# Patient Record
Sex: FEMALE | Race: WHITE | NOT HISPANIC OR LATINO | Employment: OTHER | URBAN - METROPOLITAN AREA
[De-identification: names, ages, dates, MRNs, and addresses within clinical notes are randomized per-mention and may not be internally consistent; named-entity substitution may affect disease eponyms.]

---

## 2017-01-01 ENCOUNTER — HOSPITAL ENCOUNTER (INPATIENT)
Facility: HOSPITAL | Age: 72
LOS: 1 days | DRG: 357 | End: 2017-09-07
Attending: SURGERY | Admitting: SURGERY
Payer: MEDICARE

## 2017-01-01 ENCOUNTER — ANESTHESIA EVENT (OUTPATIENT)
Dept: SURGERY | Facility: HOSPITAL | Age: 72
DRG: 357 | End: 2017-01-01
Payer: MEDICARE

## 2017-01-01 ENCOUNTER — ANESTHESIA (OUTPATIENT)
Dept: SURGERY | Facility: HOSPITAL | Age: 72
DRG: 357 | End: 2017-01-01
Payer: MEDICARE

## 2017-01-01 VITALS
TEMPERATURE: 98 F | RESPIRATION RATE: 14 BRPM | OXYGEN SATURATION: 86 % | WEIGHT: 213.63 LBS | HEIGHT: 66 IN | SYSTOLIC BLOOD PRESSURE: 80 MMHG | DIASTOLIC BLOOD PRESSURE: 54 MMHG | HEART RATE: 82 BPM | BODY MASS INDEX: 34.33 KG/M2

## 2017-01-01 DIAGNOSIS — I82.90 THROMBOSIS: ICD-10-CM

## 2017-01-01 DIAGNOSIS — I42.9 CARDIOMYOPATHY: ICD-10-CM

## 2017-01-01 DIAGNOSIS — K55.9 MESENTERIC ISCHEMIA: ICD-10-CM

## 2017-01-01 DIAGNOSIS — I82.890 SMALL VESSEL THROMBOSIS OF SKIN: ICD-10-CM

## 2017-01-01 DIAGNOSIS — R00.0 TACHYCARDIA: ICD-10-CM

## 2017-01-01 LAB
ABO + RH BLD: NORMAL
ALBUMIN SERPL BCP-MCNC: 1.8 G/DL
ALLENS TEST: ABNORMAL
ALP SERPL-CCNC: 87 U/L
ALT SERPL W/O P-5'-P-CCNC: 16 U/L
ANION GAP SERPL CALC-SCNC: 12 MMOL/L
ANION GAP SERPL CALC-SCNC: 15 MMOL/L
ANISOCYTOSIS BLD QL SMEAR: SLIGHT
APTT BLDCRRT: 28 SEC
AST SERPL-CCNC: 13 U/L
BASOPHILS # BLD AUTO: 0.01 K/UL
BASOPHILS # BLD AUTO: 0.03 K/UL
BASOPHILS NFR BLD: 0.1 %
BASOPHILS NFR BLD: 0.2 %
BILIRUB SERPL-MCNC: 1.6 MG/DL
BLD GP AB SCN CELLS X3 SERPL QL: NORMAL
BNP SERPL-MCNC: 245 PG/ML
BUN SERPL-MCNC: 55 MG/DL
BUN SERPL-MCNC: 58 MG/DL
BURR CELLS BLD QL SMEAR: ABNORMAL
CALCIUM SERPL-MCNC: 10 MG/DL
CALCIUM SERPL-MCNC: 8.9 MG/DL
CHLORIDE SERPL-SCNC: 101 MMOL/L
CHLORIDE SERPL-SCNC: 102 MMOL/L
CO2 SERPL-SCNC: 33 MMOL/L
CO2 SERPL-SCNC: 34 MMOL/L
CREAT SERPL-MCNC: 1.7 MG/DL
CREAT SERPL-MCNC: 1.8 MG/DL
DELSYS: ABNORMAL
DIFFERENTIAL METHOD: ABNORMAL
DIFFERENTIAL METHOD: ABNORMAL
EOSINOPHIL # BLD AUTO: 0 K/UL
EOSINOPHIL # BLD AUTO: 0 K/UL
EOSINOPHIL NFR BLD: 0 %
EOSINOPHIL NFR BLD: 0 %
EP: 7
ERYTHROCYTE [DISTWIDTH] IN BLOOD BY AUTOMATED COUNT: 17.2 %
ERYTHROCYTE [DISTWIDTH] IN BLOOD BY AUTOMATED COUNT: 17.3 %
ERYTHROCYTE [SEDIMENTATION RATE] IN BLOOD BY WESTERGREN METHOD: 12 MM/H
EST. GFR  (AFRICAN AMERICAN): 32 ML/MIN/1.73 M^2
EST. GFR  (AFRICAN AMERICAN): 34.2 ML/MIN/1.73 M^2
EST. GFR  (NON AFRICAN AMERICAN): 27.7 ML/MIN/1.73 M^2
EST. GFR  (NON AFRICAN AMERICAN): 29.7 ML/MIN/1.73 M^2
FACT X PPP CHRO-ACNC: 0.12 IU/ML
FACT X PPP CHRO-ACNC: 0.16 IU/ML
FACT X PPP CHRO-ACNC: 1.31 IU/ML
FIO2: 60
GLUCOSE SERPL-MCNC: 144 MG/DL
GLUCOSE SERPL-MCNC: 236 MG/DL
HCO3 UR-SCNC: 39.2 MMOL/L (ref 24–28)
HCT VFR BLD AUTO: 41.4 %
HCT VFR BLD AUTO: 46.3 %
HGB BLD-MCNC: 13.1 G/DL
HGB BLD-MCNC: 14.9 G/DL
INR PPP: 1
IP: 14
LACTATE SERPL-SCNC: 1.1 MMOL/L
LACTATE SERPL-SCNC: 1.4 MMOL/L
LYMPHOCYTES # BLD AUTO: 0.8 K/UL
LYMPHOCYTES # BLD AUTO: 0.9 K/UL
LYMPHOCYTES NFR BLD: 5.6 %
LYMPHOCYTES NFR BLD: 6.4 %
MAGNESIUM SERPL-MCNC: 2.1 MG/DL
MAGNESIUM SERPL-MCNC: 2.3 MG/DL
MCH RBC QN AUTO: 27.2 PG
MCH RBC QN AUTO: 27.6 PG
MCHC RBC AUTO-ENTMCNC: 31.6 G/DL
MCHC RBC AUTO-ENTMCNC: 32.2 G/DL
MCV RBC AUTO: 86 FL
MCV RBC AUTO: 86 FL
MIN VOL: 8.2
MITRAL VALVE REGURGITATION: NORMAL
MODE: ABNORMAL
MONOCYTES # BLD AUTO: 1.3 K/UL
MONOCYTES # BLD AUTO: 1.4 K/UL
MONOCYTES NFR BLD: 10.6 %
MONOCYTES NFR BLD: 9.1 %
NEUTROPHILS # BLD AUTO: 10.1 K/UL
NEUTROPHILS # BLD AUTO: 13.4 K/UL
NEUTROPHILS NFR BLD: 82 %
NEUTROPHILS NFR BLD: 85.1 %
PCO2 BLDA: 47.8 MMHG (ref 35–45)
PH SMN: 7.52 [PH] (ref 7.35–7.45)
PHOSPHATE SERPL-MCNC: 4.5 MG/DL
PHOSPHATE SERPL-MCNC: 5 MG/DL
PLATELET # BLD AUTO: 223 K/UL
PLATELET # BLD AUTO: 241 K/UL
PLATELET BLD QL SMEAR: ABNORMAL
PMV BLD AUTO: 9.8 FL
PMV BLD AUTO: 9.9 FL
PO2 BLDA: 78 MMHG (ref 80–100)
POC BE: 16 MMOL/L
POC SATURATED O2: 96 % (ref 95–100)
POC TCO2: 41 MMOL/L (ref 23–27)
POCT GLUCOSE: 161 MG/DL (ref 70–110)
POCT GLUCOSE: 164 MG/DL (ref 70–110)
POCT GLUCOSE: 230 MG/DL (ref 70–110)
POIKILOCYTOSIS BLD QL SMEAR: SLIGHT
POTASSIUM SERPL-SCNC: 2.8 MMOL/L
POTASSIUM SERPL-SCNC: 3.1 MMOL/L
PROT SERPL-MCNC: 5.9 G/DL
PROTHROMBIN TIME: 11.1 SEC
RBC # BLD AUTO: 4.82 M/UL
RBC # BLD AUTO: 5.39 M/UL
RETIRED EF AND QEF - SEE NOTES: 45 (ref 55–65)
SAMPLE: ABNORMAL
SITE: ABNORMAL
SODIUM SERPL-SCNC: 146 MMOL/L
SODIUM SERPL-SCNC: 151 MMOL/L
SP02: 97
SPONT RATE: 5
WBC # BLD AUTO: 12.27 K/UL
WBC # BLD AUTO: 15.89 K/UL

## 2017-01-01 PROCEDURE — 25000003 PHARM REV CODE 250: Performed by: SURGERY

## 2017-01-01 PROCEDURE — 25000003 PHARM REV CODE 250: Performed by: STUDENT IN AN ORGANIZED HEALTH CARE EDUCATION/TRAINING PROGRAM

## 2017-01-01 PROCEDURE — 27100171 HC OXYGEN HIGH FLOW UP TO 24 HOURS

## 2017-01-01 PROCEDURE — 37000009 HC ANESTHESIA EA ADD 15 MINS: Performed by: SURGERY

## 2017-01-01 PROCEDURE — 37000008 HC ANESTHESIA 1ST 15 MINUTES: Performed by: SURGERY

## 2017-01-01 PROCEDURE — A4216 STERILE WATER/SALINE, 10 ML: HCPCS | Performed by: SURGERY

## 2017-01-01 PROCEDURE — 99223 1ST HOSP IP/OBS HIGH 75: CPT | Mod: ,,, | Performed by: PHYSICIAN ASSISTANT

## 2017-01-01 PROCEDURE — 0WJP0ZZ INSPECTION OF GASTROINTESTINAL TRACT, OPEN APPROACH: ICD-10-PCS | Performed by: SURGERY

## 2017-01-01 PROCEDURE — C9113 INJ PANTOPRAZOLE SODIUM, VIA: HCPCS | Performed by: STUDENT IN AN ORGANIZED HEALTH CARE EDUCATION/TRAINING PROGRAM

## 2017-01-01 PROCEDURE — 86901 BLOOD TYPING SEROLOGIC RH(D): CPT

## 2017-01-01 PROCEDURE — 25000003 PHARM REV CODE 250: Performed by: ANESTHESIOLOGY

## 2017-01-01 PROCEDURE — 84100 ASSAY OF PHOSPHORUS: CPT

## 2017-01-01 PROCEDURE — 27000190 HC CPAP FULL FACE MASK W/VALVE

## 2017-01-01 PROCEDURE — 27800505 HC CATH, RADIAL ARTERY KIT: Performed by: NURSE ANESTHETIST, CERTIFIED REGISTERED

## 2017-01-01 PROCEDURE — 93307 TTE W/O DOPPLER COMPLETE: CPT | Mod: 26,,, | Performed by: INTERNAL MEDICINE

## 2017-01-01 PROCEDURE — 93005 ELECTROCARDIOGRAM TRACING: CPT

## 2017-01-01 PROCEDURE — 63600175 PHARM REV CODE 636 W HCPCS: Performed by: ANESTHESIOLOGY

## 2017-01-01 PROCEDURE — 85025 COMPLETE CBC W/AUTO DIFF WBC: CPT

## 2017-01-01 PROCEDURE — 94761 N-INVAS EAR/PLS OXIMETRY MLT: CPT

## 2017-01-01 PROCEDURE — 27100025 HC TUBING, SET FLUID WARMER: Performed by: NURSE ANESTHETIST, CERTIFIED REGISTERED

## 2017-01-01 PROCEDURE — 82803 BLOOD GASES ANY COMBINATION: CPT

## 2017-01-01 PROCEDURE — 36415 COLL VENOUS BLD VENIPUNCTURE: CPT

## 2017-01-01 PROCEDURE — 63600175 PHARM REV CODE 636 W HCPCS: Performed by: NURSE ANESTHETIST, CERTIFIED REGISTERED

## 2017-01-01 PROCEDURE — 49000 EXPLORATION OF ABDOMEN: CPT | Mod: ,,, | Performed by: SURGERY

## 2017-01-01 PROCEDURE — 99223 1ST HOSP IP/OBS HIGH 75: CPT | Mod: 57,,, | Performed by: SURGERY

## 2017-01-01 PROCEDURE — 85520 HEPARIN ASSAY: CPT | Mod: 91

## 2017-01-01 PROCEDURE — 86900 BLOOD TYPING SEROLOGIC ABO: CPT | Mod: 91

## 2017-01-01 PROCEDURE — 93307 TTE W/O DOPPLER COMPLETE: CPT

## 2017-01-01 PROCEDURE — 36000706: Performed by: SURGERY

## 2017-01-01 PROCEDURE — 83605 ASSAY OF LACTIC ACID: CPT

## 2017-01-01 PROCEDURE — 94660 CPAP INITIATION&MGMT: CPT

## 2017-01-01 PROCEDURE — 85610 PROTHROMBIN TIME: CPT

## 2017-01-01 PROCEDURE — D9220A PRA ANESTHESIA: Mod: CRNA,,, | Performed by: NURSE ANESTHETIST, CERTIFIED REGISTERED

## 2017-01-01 PROCEDURE — 84100 ASSAY OF PHOSPHORUS: CPT | Mod: 91

## 2017-01-01 PROCEDURE — S0030 INJECTION, METRONIDAZOLE: HCPCS | Performed by: STUDENT IN AN ORGANIZED HEALTH CARE EDUCATION/TRAINING PROGRAM

## 2017-01-01 PROCEDURE — 99900035 HC TECH TIME PER 15 MIN (STAT)

## 2017-01-01 PROCEDURE — 63600175 PHARM REV CODE 636 W HCPCS: Performed by: SURGERY

## 2017-01-01 PROCEDURE — 27000221 HC OXYGEN, UP TO 24 HOURS

## 2017-01-01 PROCEDURE — 86920 COMPATIBILITY TEST SPIN: CPT

## 2017-01-01 PROCEDURE — 85730 THROMBOPLASTIN TIME PARTIAL: CPT

## 2017-01-01 PROCEDURE — 94002 VENT MGMT INPAT INIT DAY: CPT

## 2017-01-01 PROCEDURE — 80053 COMPREHEN METABOLIC PANEL: CPT

## 2017-01-01 PROCEDURE — 63600175 PHARM REV CODE 636 W HCPCS: Performed by: STUDENT IN AN ORGANIZED HEALTH CARE EDUCATION/TRAINING PROGRAM

## 2017-01-01 PROCEDURE — 83880 ASSAY OF NATRIURETIC PEPTIDE: CPT

## 2017-01-01 PROCEDURE — 27201037 HC PRESSURE MONITORING SET UP

## 2017-01-01 PROCEDURE — 83735 ASSAY OF MAGNESIUM: CPT

## 2017-01-01 PROCEDURE — 83735 ASSAY OF MAGNESIUM: CPT | Mod: 91

## 2017-01-01 PROCEDURE — 86850 RBC ANTIBODY SCREEN: CPT | Mod: 91

## 2017-01-01 PROCEDURE — 86900 BLOOD TYPING SEROLOGIC ABO: CPT

## 2017-01-01 PROCEDURE — 83605 ASSAY OF LACTIC ACID: CPT | Mod: 91

## 2017-01-01 PROCEDURE — 25000003 PHARM REV CODE 250: Performed by: NURSE ANESTHETIST, CERTIFIED REGISTERED

## 2017-01-01 PROCEDURE — 93010 ELECTROCARDIOGRAM REPORT: CPT | Mod: ,,, | Performed by: INTERNAL MEDICINE

## 2017-01-01 PROCEDURE — 20000000 HC ICU ROOM

## 2017-01-01 PROCEDURE — 36000707: Performed by: SURGERY

## 2017-01-01 PROCEDURE — 63600175 PHARM REV CODE 636 W HCPCS

## 2017-01-01 PROCEDURE — 36600 WITHDRAWAL OF ARTERIAL BLOOD: CPT

## 2017-01-01 PROCEDURE — D9220A PRA ANESTHESIA: Mod: ANES,,, | Performed by: ANESTHESIOLOGY

## 2017-01-01 PROCEDURE — 85520 HEPARIN ASSAY: CPT

## 2017-01-01 PROCEDURE — 80048 BASIC METABOLIC PNL TOTAL CA: CPT

## 2017-01-01 PROCEDURE — 27100019 HC AMBU BAG ADULT/PED: Performed by: NURSE ANESTHETIST, CERTIFIED REGISTERED

## 2017-01-01 RX ORDER — POTASSIUM CHLORIDE 7.45 MG/ML
60 INJECTION INTRAVENOUS
Status: DISCONTINUED | OUTPATIENT
Start: 2017-01-01 | End: 2017-01-01

## 2017-01-01 RX ORDER — ETOMIDATE 2 MG/ML
INJECTION INTRAVENOUS
Status: DISCONTINUED | OUTPATIENT
Start: 2017-01-01 | End: 2017-01-01

## 2017-01-01 RX ORDER — SODIUM CHLORIDE 9 MG/ML
INJECTION, SOLUTION INTRAVENOUS CONTINUOUS
Status: DISCONTINUED | OUTPATIENT
Start: 2017-01-01 | End: 2017-01-01

## 2017-01-01 RX ORDER — MAGNESIUM SULFATE HEPTAHYDRATE 40 MG/ML
4 INJECTION, SOLUTION INTRAVENOUS
Status: DISCONTINUED | OUTPATIENT
Start: 2017-01-01 | End: 2017-01-01

## 2017-01-01 RX ORDER — SCOLOPAMINE TRANSDERMAL SYSTEM 1 MG/1
1 PATCH, EXTENDED RELEASE TRANSDERMAL
Status: DISCONTINUED | OUTPATIENT
Start: 2017-01-01 | End: 2017-01-01 | Stop reason: HOSPADM

## 2017-01-01 RX ORDER — MIDAZOLAM HYDROCHLORIDE 1 MG/ML
INJECTION, SOLUTION INTRAMUSCULAR; INTRAVENOUS
Status: DISCONTINUED | OUTPATIENT
Start: 2017-01-01 | End: 2017-01-01

## 2017-01-01 RX ORDER — HEPARIN SODIUM 10000 [USP'U]/100ML
30 INJECTION, SOLUTION INTRAVENOUS
Status: DISCONTINUED | OUTPATIENT
Start: 2017-01-01 | End: 2017-01-01

## 2017-01-01 RX ORDER — LORAZEPAM 2 MG/ML
0.5 INJECTION INTRAMUSCULAR EVERY 30 MIN PRN
Status: DISCONTINUED | OUTPATIENT
Start: 2017-01-01 | End: 2017-01-01 | Stop reason: HOSPADM

## 2017-01-01 RX ORDER — DILTIAZEM HCL 1 MG/ML
5 INJECTION, SOLUTION INTRAVENOUS CONTINUOUS
Status: DISCONTINUED | OUTPATIENT
Start: 2017-01-01 | End: 2017-01-01

## 2017-01-01 RX ORDER — POTASSIUM CHLORIDE 7.45 MG/ML
40 INJECTION INTRAVENOUS
Status: DISCONTINUED | OUTPATIENT
Start: 2017-01-01 | End: 2017-01-01

## 2017-01-01 RX ORDER — PANTOPRAZOLE SODIUM 40 MG/10ML
40 INJECTION, POWDER, LYOPHILIZED, FOR SOLUTION INTRAVENOUS DAILY
Status: DISCONTINUED | OUTPATIENT
Start: 2017-01-01 | End: 2017-01-01

## 2017-01-01 RX ORDER — SUCCINYLCHOLINE CHLORIDE 20 MG/ML
INJECTION INTRAMUSCULAR; INTRAVENOUS
Status: DISCONTINUED | OUTPATIENT
Start: 2017-01-01 | End: 2017-01-01

## 2017-01-01 RX ORDER — LORAZEPAM 2 MG/ML
1 INJECTION INTRAMUSCULAR EVERY 30 MIN PRN
Status: DISCONTINUED | OUTPATIENT
Start: 2017-01-01 | End: 2017-01-01 | Stop reason: HOSPADM

## 2017-01-01 RX ORDER — ROCURONIUM BROMIDE 10 MG/ML
INJECTION, SOLUTION INTRAVENOUS
Status: DISCONTINUED | OUTPATIENT
Start: 2017-01-01 | End: 2017-01-01

## 2017-01-01 RX ORDER — GLUCAGON 1 MG
1 KIT INJECTION
Status: DISCONTINUED | OUTPATIENT
Start: 2017-01-01 | End: 2017-01-01

## 2017-01-01 RX ORDER — METOPROLOL TARTRATE 1 MG/ML
5 INJECTION, SOLUTION INTRAVENOUS EVERY 4 HOURS
Status: DISCONTINUED | OUTPATIENT
Start: 2017-01-01 | End: 2017-01-01

## 2017-01-01 RX ORDER — HYDROMORPHONE HYDROCHLORIDE 1 MG/ML
0.5 INJECTION, SOLUTION INTRAMUSCULAR; INTRAVENOUS; SUBCUTANEOUS EVERY 4 HOURS PRN
Status: DISCONTINUED | OUTPATIENT
Start: 2017-01-01 | End: 2017-01-01 | Stop reason: HOSPADM

## 2017-01-01 RX ORDER — INSULIN ASPART 100 [IU]/ML
1-10 INJECTION, SOLUTION INTRAVENOUS; SUBCUTANEOUS EVERY 6 HOURS PRN
Status: DISCONTINUED | OUTPATIENT
Start: 2017-01-01 | End: 2017-01-01

## 2017-01-01 RX ORDER — METRONIDAZOLE 500 MG/100ML
500 INJECTION, SOLUTION INTRAVENOUS
Status: DISCONTINUED | OUTPATIENT
Start: 2017-01-01 | End: 2017-01-01

## 2017-01-01 RX ORDER — PANTOPRAZOLE SODIUM 40 MG/10ML
40 INJECTION, POWDER, LYOPHILIZED, FOR SOLUTION INTRAVENOUS 2 TIMES DAILY
Status: DISCONTINUED | OUTPATIENT
Start: 2017-01-01 | End: 2017-01-01

## 2017-01-01 RX ORDER — MAGNESIUM SULFATE HEPTAHYDRATE 40 MG/ML
2 INJECTION, SOLUTION INTRAVENOUS
Status: DISCONTINUED | OUTPATIENT
Start: 2017-01-01 | End: 2017-01-01

## 2017-01-01 RX ORDER — SODIUM CHLORIDE 0.9 % (FLUSH) 0.9 %
3 SYRINGE (ML) INJECTION EVERY 8 HOURS
Status: DISCONTINUED | OUTPATIENT
Start: 2017-01-01 | End: 2017-01-01 | Stop reason: HOSPADM

## 2017-01-01 RX ORDER — METOPROLOL TARTRATE 1 MG/ML
5 INJECTION, SOLUTION INTRAVENOUS ONCE
Status: COMPLETED | OUTPATIENT
Start: 2017-01-01 | End: 2017-01-01

## 2017-01-01 RX ORDER — POTASSIUM CHLORIDE 14.9 MG/ML
20 INJECTION INTRAVENOUS ONCE
Status: COMPLETED | OUTPATIENT
Start: 2017-01-01 | End: 2017-01-01

## 2017-01-01 RX ORDER — MORPHINE SULFATE 2 MG/ML
4 INJECTION, SOLUTION INTRAMUSCULAR; INTRAVENOUS ONCE
Status: COMPLETED | OUTPATIENT
Start: 2017-01-01 | End: 2017-01-01

## 2017-01-01 RX ORDER — PANTOPRAZOLE SODIUM 40 MG/10ML
40 INJECTION, POWDER, LYOPHILIZED, FOR SOLUTION INTRAVENOUS 2 TIMES DAILY
Status: CANCELLED | OUTPATIENT
Start: 2017-01-01

## 2017-01-01 RX ORDER — HEPARIN SODIUM,PORCINE/D5W 25000/250
21 INTRAVENOUS SOLUTION INTRAVENOUS CONTINUOUS
Status: DISCONTINUED | OUTPATIENT
Start: 2017-01-01 | End: 2017-01-01

## 2017-01-01 RX ORDER — ONDANSETRON 2 MG/ML
4 INJECTION INTRAMUSCULAR; INTRAVENOUS EVERY 6 HOURS PRN
Status: DISCONTINUED | OUTPATIENT
Start: 2017-01-01 | End: 2017-01-01

## 2017-01-01 RX ORDER — POTASSIUM CHLORIDE 7.45 MG/ML
80 INJECTION INTRAVENOUS
Status: DISCONTINUED | OUTPATIENT
Start: 2017-01-01 | End: 2017-01-01

## 2017-01-01 RX ORDER — LIDOCAINE HCL/PF 100 MG/5ML
SYRINGE (ML) INTRAVENOUS
Status: DISCONTINUED | OUTPATIENT
Start: 2017-01-01 | End: 2017-01-01

## 2017-01-01 RX ORDER — FENTANYL CITRATE 50 UG/ML
INJECTION, SOLUTION INTRAMUSCULAR; INTRAVENOUS
Status: DISCONTINUED | OUTPATIENT
Start: 2017-01-01 | End: 2017-01-01

## 2017-01-01 RX ADMIN — FENTANYL CITRATE 50 MCG: 50 INJECTION, SOLUTION INTRAMUSCULAR; INTRAVENOUS at 03:09

## 2017-01-01 RX ADMIN — METRONIDAZOLE 500 MG: 500 INJECTION, SOLUTION INTRAVENOUS at 09:09

## 2017-01-01 RX ADMIN — SCOPALAMINE 1 PATCH: 1 PATCH, EXTENDED RELEASE TRANSDERMAL at 06:09

## 2017-01-01 RX ADMIN — INSULIN ASPART 4 UNITS: 100 INJECTION, SOLUTION INTRAVENOUS; SUBCUTANEOUS at 12:09

## 2017-01-01 RX ADMIN — SODIUM CHLORIDE, SODIUM GLUCONATE, SODIUM ACETATE, POTASSIUM CHLORIDE, MAGNESIUM CHLORIDE, SODIUM PHOSPHATE, DIBASIC, AND POTASSIUM PHOSPHATE: .53; .5; .37; .037; .03; .012; .00082 INJECTION, SOLUTION INTRAVENOUS at 03:09

## 2017-01-01 RX ADMIN — SUCCINYLCHOLINE CHLORIDE 120 MG: 20 INJECTION, SOLUTION INTRAMUSCULAR; INTRAVENOUS at 02:09

## 2017-01-01 RX ADMIN — HYDROMORPHONE HYDROCHLORIDE 0.5 MG: 1 INJECTION, SOLUTION INTRAMUSCULAR; INTRAVENOUS; SUBCUTANEOUS at 11:09

## 2017-01-01 RX ADMIN — LIDOCAINE HYDROCHLORIDE 80 MG: 20 INJECTION, SOLUTION INTRAVENOUS at 02:09

## 2017-01-01 RX ADMIN — SODIUM CHLORIDE 500 ML: 900 INJECTION, SOLUTION INTRAVENOUS at 05:09

## 2017-01-01 RX ADMIN — ROCURONIUM BROMIDE 10 MG: 10 INJECTION, SOLUTION INTRAVENOUS at 03:09

## 2017-01-01 RX ADMIN — MORPHINE SULFATE 1 MG/HR: 10 INJECTION INTRAMUSCULAR; INTRAVENOUS; SUBCUTANEOUS at 05:09

## 2017-01-01 RX ADMIN — Medication 3 ML: at 05:09

## 2017-01-01 RX ADMIN — MORPHINE SULFATE 4 MG: 2 INJECTION, SOLUTION INTRAMUSCULAR; INTRAVENOUS at 05:09

## 2017-01-01 RX ADMIN — HEPARIN SODIUM AND DEXTROSE 30 UNITS/KG/HR: 10000; 5 INJECTION INTRAVENOUS at 06:09

## 2017-01-01 RX ADMIN — MIDAZOLAM HYDROCHLORIDE 2 MG: 1 INJECTION, SOLUTION INTRAMUSCULAR; INTRAVENOUS at 03:09

## 2017-01-01 RX ADMIN — Medication 3 ML: at 06:09

## 2017-01-01 RX ADMIN — ROCURONIUM BROMIDE 20 MG: 10 INJECTION, SOLUTION INTRAVENOUS at 03:09

## 2017-01-01 RX ADMIN — CEFTRIAXONE 2 G: 2 INJECTION, SOLUTION INTRAVENOUS at 10:09

## 2017-01-01 RX ADMIN — ETOMIDATE 10 MG: 2 INJECTION, SOLUTION INTRAVENOUS at 02:09

## 2017-01-01 RX ADMIN — DILTIAZEM HYDROCHLORIDE 5 MG/HR: 5 INJECTION INTRAVENOUS at 06:09

## 2017-01-01 RX ADMIN — METOPROLOL TARTRATE 5 MG: 5 INJECTION INTRAVENOUS at 04:09

## 2017-01-01 RX ADMIN — PANTOPRAZOLE SODIUM 40 MG: 40 INJECTION, POWDER, FOR SOLUTION INTRAVENOUS at 06:09

## 2017-01-01 RX ADMIN — Medication 3 ML: at 02:09

## 2017-01-01 RX ADMIN — POTASSIUM CHLORIDE 20 MEQ: 200 INJECTION, SOLUTION INTRAVENOUS at 12:09

## 2017-01-01 RX ADMIN — HEPARIN SODIUM AND DEXTROSE 17 UNITS/KG/HR: 10000; 5 INJECTION INTRAVENOUS at 03:09

## 2017-01-01 RX ADMIN — SODIUM CHLORIDE: 0.9 INJECTION, SOLUTION INTRAVENOUS at 03:09

## 2017-01-01 RX ADMIN — MORPHINE SULFATE 1 MG/HR: 10 INJECTION INTRAMUSCULAR; INTRAVENOUS; SUBCUTANEOUS at 10:09

## 2017-01-01 RX ADMIN — SODIUM CHLORIDE, SODIUM GLUCONATE, SODIUM ACETATE, POTASSIUM CHLORIDE, MAGNESIUM CHLORIDE, SODIUM PHOSPHATE, DIBASIC, AND POTASSIUM PHOSPHATE: .53; .5; .37; .037; .03; .012; .00082 INJECTION, SOLUTION INTRAVENOUS at 02:09

## 2017-01-01 RX ADMIN — SODIUM CHLORIDE, SODIUM LACTATE, POTASSIUM CHLORIDE, AND CALCIUM CHLORIDE 500 ML: .6; .31; .03; .02 INJECTION, SOLUTION INTRAVENOUS at 09:09

## 2017-01-01 RX ADMIN — INSULIN ASPART 2 UNITS: 100 INJECTION, SOLUTION INTRAVENOUS; SUBCUTANEOUS at 05:09

## 2017-01-01 RX ADMIN — DEXTROSE 8 MG/HR: 50 INJECTION, SOLUTION INTRAVENOUS at 09:09

## 2017-09-05 PROBLEM — I82.90 THROMBOSIS: Status: ACTIVE | Noted: 2017-01-01

## 2017-09-06 NOTE — PROGRESS NOTES
Pt admitted to SICU 6003 from outside facility.  Connected to ICU monitor. MD @ bedside.  Labs sent.  Shari replaced.  Xray at bedside.  Hr 120s, SBP 150s. Pt placed on BiPAP 60%. EKG.  Will carry out any new orders. Will continue to monitor pt closely.       Skin note:  Excoriation noted under bilateral breast & groin region.

## 2017-09-06 NOTE — PROGRESS NOTES
Pt transported from OR to SICU 6078 intubated on 100% FiO2 via AMBU bag. Upon arrival to 6078, pt connected to ventilator by respiratory therapist. Ventilator settings: A/C 60% & 5 PEEP. Pt on 5 mg/hr of Cardizem. Lt radial arterial line placed in operating room. Dr. Hansen at bedside upon pt's arrival. DNR in place. Plan is to keep pt comfortable/continue end of life care. All VSS. Will continue to monitor closely.

## 2017-09-06 NOTE — H&P
[facility]  General Surgery  History & Physical    Patient Name: Mili Zendejas  MRN: [id number]  Admission Date: 9/6/2017  Attending Physician: David Keller MD   Primary Care Provider: Primary Doctor No    Patient information was obtained from patient records and family    Subjective:     Chief Complaint/Reason for Admission: SMA occlusion    History of Present Illness:  Patient is a 72 y.o. female with a past medical history significant for HTN and confusion for the past several months who presented to an outside hospital on Friday for abdominal pain, nausea and vomiting. While there she was treated for hypoxic respiratory failure and presumed heart failure. 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 There is diffuse small bowel ileus and there are multiple loops of small bowel without mucosal enhancement suggestive of ischemic bowel.  Per patient her pain is unchanged since Friday. Per her family, she gets around at home but does not leave the house. At baseline she is confused and short of breath. She has been prescribed medication for HTN but does not take it. Her family was unaware that she has atrial fibrillation.  She was not on any anticoagulation.    No current facility-administered medications on file prior to encounter.      No current outpatient prescriptions on file prior to encounter.       Review of patient's allergies indicates:  Not on File    No past medical history on file.  No past surgical history on file.  Family History     None        Social History Main Topics    Smoking status: Not on file    Smokeless tobacco: Not on file    Alcohol use Not on file    Drug use: Unknown    Sexual activity: Not on file     Review of Systems   Constitutional: Positive for activity change, appetite change and fatigue.    Respiratory: Positive for shortness of breath. Negative for cough.    Cardiovascular: Negative for palpitations.   Gastrointestinal: Positive for abdominal pain. Negative for nausea and vomiting.   Psychiatric/Behavioral: Positive for confusion.     Objective:     Vital Signs (Most Recent):  Temp: 98.8 °F (37.1 °C) (09/06/17 0320)  Pulse: (!) 135 (09/06/17 0400)  Resp: (!) 31 (09/06/17 0400)  BP: (!) 143/96 (09/06/17 0400)  SpO2: (!) 94 % (09/06/17 0400) Vital Signs (24h Range):  Temp:  [98.8 °F (37.1 °C)] 98.8 °F (37.1 °C)  Pulse:  [130-145] 135  Resp:  [22-31] 31  SpO2:  [91 %-95 %] 94 %  BP: (143-156)/(94-96) 143/96     Weight: 96.9 kg (213 lb 10 oz)  Body mass index is 34.48 kg/m².    Physical Exam   Constitutional: She appears well-developed and well-nourished. No distress.   Cardiovascular: An irregularly irregular rhythm present.   Poorly rate controlled on arrival   Pulmonary/Chest: No respiratory distress (coarse breath sounds bilaterally. Currently on bipap ).   Abdominal: Soft. She exhibits distension. There is tenderness. There is rebound and guarding.   Skin: Skin is warm and dry.   Neuro: Arouses to voice. Answers questions appropriately. Oriented to person but not place or time    Significant Labs:  CBC:   Recent Labs  Lab 09/06/17  0323   RBC 5.39   HGB 14.9   HCT 46.3      MCV 86   MCH 27.6   MCHC 32.2     CMP:   Recent Labs  Lab 09/06/17  0323   *   CALCIUM 10.0   ALBUMIN 1.8*   PROT 5.9*   *   K 3.1*   CO2 34*      BUN 55*   CREATININE 1.8*   ALKPHOS 87   ALT 16   AST 13   BILITOT 1.6*     ABGs:   Recent Labs  Lab 09/06/17  0455   PH 7.522*   PCO2 47.8*   PO2 78*   HCO3 39.2*   POCSATURATED 96   BE 16       Significant Diagnostics:  CT read:  There is embolus/thrombosis occluding the mid superior mesenteric artery approximately 4cm distal to the origin with thrombus likely extending into the distal branches. There is a diffuse small bowel ileus and there are multiple  loops of small bowel without mucosal enhancement. These findings suggest ischemic bowel. Vascular/interventional consult is indicated at this time.    Assessment/Plan:     Active Diagnoses:    Diagnosis Date Noted POA    Thrombosis [I82.90] 09/05/2017 Yes      Problems Resolved During this Admission:    Diagnosis Date Noted Date Resolved POA     VTE Risk Mitigation         Ordered     Medium Risk of VTE  Once      09/06/17 0321     Place sequential compression device  Until discontinued      09/06/17 0321        71 yo female with mesenteric ischemia    Plan:  Continue dilt drip  Continue heparin drip  2D echo ordered  Daily labs  MIVF  NPO  Continue NG  Continue andrew  Discussed with staff.       Carmina Altamirano MD  General Surgery  [facility]

## 2017-09-06 NOTE — ANESTHESIA PREPROCEDURE EVALUATION
09/06/2017    Pre-operative evaluation for Procedure(s) (LRB):  EXPLORATORY-LAPAROTOMY (N/A)    Mili Zendejas is a 72 y.o. female with a past medical history significant for HTN and confusion for the past several months who presented to an outside hospital on Friday for abdominal pain, nausea and vomiting. While there she was treated for hypoxic respiratory failure and presumed heart failure. 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 There is diffuse small bowel ileus and there are multiple loops of small bowel without mucosal enhancement suggestive of ischemic bowel.  Per patient her pain is unchanged since Friday.  Most recent EKG showed AFib with RVR.    Patient Active Problem List   Diagnosis    Thrombosis       Review of patient's allergies indicates:  Not on File    No current facility-administered medications on file prior to encounter.      No current outpatient prescriptions on file prior to encounter.       No past surgical history on file.    Social History     Social History    Marital status:      Spouse name: N/A    Number of children: N/A    Years of education: N/A     Occupational History    Not on file.     Social History Main Topics    Smoking status: Not on file    Smokeless tobacco: Not on file    Alcohol use Not on file    Drug use: Unknown    Sexual activity: Not on file     Other Topics Concern    Not on file     Social History Narrative    No narrative on file         Vital Signs Range (Last 24H):  Temp:  [36.4 °C (97.5 °F)-37.1 °C (98.8 °F)]   Pulse:  [102-145]   Resp:  [15-32]   BP: (113-156)/()   SpO2:  [91 %-97 %]       CBC:   Recent Labs      09/06/17   0323  09/06/17   1058   WBC  15.89*   12.27   RBC  5.39  4.82   HGB  14.9  13.1   HCT  46.3  41.4   PLT  241  223   MCV  86  86   MCH  27.6  27.2   MCHC  32.2  31.6*       CMP:   Recent Labs      09/06/17   0323  09/06/17   1058   NA  151*  146*   K  3.1*  2.8*   CL  102  101   CO2  34*  33*   BUN  55*  58*   CREATININE  1.8*  1.7*   GLU  144*  236*   MG  2.3  2.1   PHOS  4.5  5.0*   CALCIUM  10.0  8.9   ALBUMIN  1.8*   --    PROT  5.9*   --    ALKPHOS  87   --    ALT  16   --    AST  13   --    BILITOT  1.6*   --        INR  Recent Labs      09/06/17   0323   INR  1.0   APTT  28.0           Diagnostic Studies:      EKG:  Atrial fibrillation with rapid ventricular response  Left axis deviation  Incomplete right bundle branch block  Minimal voltage criteria for LVH, may be normal variant  Inferior infarct ,age undetermined  Anterolateral infarct ,age undetermined  Abnormal ECG  No previous ECGs available    2D Echo:  pending      Anesthesia Evaluation    I have reviewed the Patient Summary Reports.    I have reviewed the Nursing Notes.   I have reviewed the Medications.     Review of Systems  Anesthesia Hx:  Denies Family Hx of Anesthesia complications.   Denies Personal Hx of Anesthesia complications.   Cardiovascular:   Hypertension Dysrhythmias atrial fibrillation    Pulmonary:   Denies COPD.    Renal/:   Denies Chronic Renal Disease.     Hepatic/GI:  Hepatic/GI Normal    Neurological:   Denies CVA.  Dementia    Endocrine:   Denies Diabetes.    Psych:  Psychiatric Normal           Physical Exam  General:  Well nourished    Airway/Jaw/Neck:  Airway Findings: Mouth Opening: Normal Tongue: Normal  General Airway Assessment: Adult  Mallampati: III  Improves to II with phonation.  TM Distance: Normal, at least 6 cm          Chest/Lungs:  Chest/Lungs Findings: Clear to auscultation, Normal Respiratory Rate     Heart/Vascular:  Heart Findings: Rate: Normal  Rhythm: Regular Rhythm        Mental Status:  Mental Status Findings:  Cooperative, Alert and  Oriented         Anesthesia Plan  Type of Anesthesia, risks & benefits discussed:  Anesthesia Type:  general  Patient's Preference:   Intra-op Monitoring Plan: standard ASA monitors  Intra-op Monitoring Plan Comments:   Post Op Pain Control Plan: multimodal analgesia and per primary service following discharge from PACU  Post Op Pain Control Plan Comments:   Induction:   IV  Beta Blocker:  Patient is not currently on a Beta-Blocker (No further documentation required).       Informed Consent: Patient representative understands risks and agrees with Anesthesia plan.  Questions answered. Anesthesia consent signed with patient representative.  ASA Score: 4  emergent   Day of Surgery Review of History & Physical:    H&P update referred to the surgeon.         Ready For Surgery From Anesthesia Perspective.

## 2017-09-06 NOTE — PLAN OF CARE
Met w  and daughter. Pt just taken down for surgery and they are concerned about her prognosis.  Cm/Sw to follow.     09/06/17 1510   Discharge Assessment   Assessment Type Discharge Planning Assessment   Confirmed/corrected address and phone number on facesheet? Yes   Assessment information obtained from? Caregiver   Expected Length of Stay (days) 6   Communicated expected length of stay with patient/caregiver yes   Prior to hospitilization cognitive status: Alert/Oriented  (hx slight dementia per )   Prior to hospitalization functional status: Independent   Current cognitive status: Alert/Oriented  (hx slight dementia)   Current Functional Status: Independent   Lives With spouse   Able to Return to Prior Arrangements yes   Is patient able to care for self after discharge? No   Patient's perception of discharge disposition ( says that it's a poor prognosis)   Patient currently being followed by outpatient case management? No   Patient currently receives any other outside agency services? No   Equipment Currently Used at Home none   Do you have any problems affording any of your prescribed medications? No   Is the patient taking medications as prescribed? yes   Does the patient have transportation home? Yes   Transportation Available family or friend will provide   Does the patient receive services at the [facility]madin Clinic? No   Discharge Plan A Skilled Nursing Facility   Discharge Plan B Home with family;Home Health   Patient/Family In Agreement With Plan yes

## 2017-09-06 NOTE — PLAN OF CARE
On-call  contacted to transfer pt back to [place].   advised family speak with kwabena/ketty on tomorrow.

## 2017-09-06 NOTE — TRANSFER OF CARE
"Anesthesia Transfer of Care Note    Patient: Mili Zendejas    Procedure(s) Performed: Procedure(s) (LRB):  EXPLORATORY-LAPAROTOMY (N/A)    Patient location: ICU    Anesthesia Type: general    Transport from OR: Transported from OR intubated on 100% O2 by AMBU with adequate controlled ventilation. Upon arrival to PACU/ICU, patient attached to ventilator and auscultated to confirm bilateral breath sounds and adequate TV. Continuous ECG monitoring in transport. Continuous SpO2 monitoring in transport. Continuos invasive BP monitoring in transport    Post pain: adequate analgesia    Post assessment: no apparent anesthetic complications    Post vital signs: stable    Level of consciousness: sedated    Complications: none    Transfer of care protocol was followed      Last vitals:   Visit Vitals  /80 (BP Location: Right arm, Patient Position: Lying)   Pulse (!) 111   Temp 36.9 °C (98.5 °F) (Oral)   Resp 16   Ht 5' 6" (1.676 m)   Wt 96.9 kg (213 lb 10 oz)   SpO2 (!) 93%   BMI 34.48 kg/m²     "

## 2017-09-06 NOTE — NURSING TRANSFER
Nursing Transfer Note      9/6/2017     Transfer To: OR    Transfer via bed    Transfer with to O2, cardiac monitoring    Transported by anesthesia    Medicines sent:     Chart send with patient: Yes    Notified: spouse, daughter    Patient reassessed at: 1400     Upon arrival to floor:

## 2017-09-06 NOTE — CONSULTS
[facility]  General Surgery  Consult Note    Inpatient consult to General Surgery  Consult performed by: ASH FOSTER  Consult ordered by: JEANETTE NAJERA  Reason for consult: ischemic bowel?  Assessment/Recommendations: To OR for ex lap        Subjective:     Chief Complaint/Reason for Admission: abdominal pain    History of Present Illness: 72 year old woman with history as noted above, notably five days of severe abdominal pain in the setting of a mid/distal SMA occlusion. She initially presented with respiratory distress and afib in addition to the abdominal pain on 9/1 to an OSH. CT with contrast was performed 9/5 and demonstrated the SMA occlusion, originating in the mid/distal third of the main trunk SMA with no distal reconstitution.  Several jejunal branches and the middle colic are patent.  She was systemically anticoagulated with heparin beginning on 9/5.  She has no lab abnormalities to suggest late stage mesenteric ischemia or necrosis, however, she is quite tender on exam.     No current facility-administered medications on file prior to encounter.      No current outpatient prescriptions on file prior to encounter.       Review of patient's allergies indicates:  Not on File    No past medical history on file.  No past surgical history on file.  Family History     None        Social History Main Topics    Smoking status: Not on file    Smokeless tobacco: Not on file    Alcohol use Not on file    Drug use: Unknown    Sexual activity: Not on file     Review of Systems   Gastrointestinal: Positive for abdominal pain.   All other systems reviewed and are negative.    Objective:     Vital Signs (Most Recent):  Temp: 98.5 °F (36.9 °C) (09/06/17 1100)  Pulse: (!) 112 (09/06/17 1115)  Resp: (!) 21 (09/06/17 1115)  BP: (!) 145/86 (09/06/17 1115)  SpO2: (!) 93 % (09/06/17 1115) Vital Signs (24h Range):  Temp:  [97.5 °F (36.4 °C)-98.8 °F (37.1 °C)] 98.5 °F (36.9 °C)  Pulse:   [102-145] 112  Resp:  [15-32] 21  SpO2:  [91 %-97 %] 93 %  BP: (113-156)/() 145/86     Weight: 96.9 kg (213 lb 10 oz)  Body mass index is 34.48 kg/m².      Intake/Output Summary (Last 24 hours) at 09/06/17 1440  Last data filed at 09/06/17 1100   Gross per 24 hour   Intake            90.58 ml   Output              565 ml   Net          -474.42 ml       Physical Exam   Constitutional: She appears distressed.   Eyes: Pupils are equal, round, and reactive to light.   Neck: No tracheal deviation present.   Pulmonary/Chest: Effort normal.   Abdominal: There is tenderness.   Neurological: She is alert.   Psychiatric: She has a normal mood and affect.   Vitals reviewed.      Significant Labs:  CBC:   Recent Labs  Lab 09/06/17  1058   WBC 12.27   RBC 4.82   HGB 13.1   HCT 41.4      MCV 86   MCH 27.2   MCHC 31.6*     CMP:   Recent Labs  Lab 09/06/17  0323 09/06/17  1058   * 236*   CALCIUM 10.0 8.9   ALBUMIN 1.8*  --    PROT 5.9*  --    * 146*   K 3.1* 2.8*   CO2 34* 33*    101   BUN 55* 58*   CREATININE 1.8* 1.7*   ALKPHOS 87  --    ALT 16  --    AST 13  --    BILITOT 1.6*  --        Significant Diagnostics:  CT: I have reviewed all pertinent results/findings within the past 24 hours.      Assessment/Plan:     Active Diagnoses:    Diagnosis Date Noted POA    Mesenteric ischemia [K55.9]  Unknown    Thrombosis [I82.90] 09/05/2017 Yes      Problems Resolved During this Admission:    Diagnosis Date Noted Date Resolved POA       Thank you for your consult. We will go to OR for ex lap    Haja Perez MD  General Surgery  [facility]

## 2017-09-06 NOTE — H&P
History & Physical  Surgical Intensive Care    SUBJECTIVE:     Chief Complaint/Reason for Admission:     History of Present Illness:  Patient is a 72 y.o. female with w past medical hx of HTN who was transferred here from [facility] in [place] for suspected SMA thrombosis. Pt presented to outside ED 9/3/17 for a 2 day hx of abdominal pain, nausea, and vomiting. At OSH she was noted to be in A fibb with RVR and associated respiratory distress and was placed on cardizem drip and IV lasix for pulmonary edema in the ICU. CT abdomen with contrast obtained at OSH showed embolus/thrombosis of the mid superior SMA.     On arrival to ICU patient is on BIPAP with an NGT and andrew in place and zosyn drip. She is tachycardic and hypertensive. C/o abdominal pain with diffuse abdominal tenderness.         No prescriptions prior to admission.     Review of patient's allergies indicates:  Not on File    No past medical history on file.  No past surgical history on file.  No family history on file.  Social History   Substance Use Topics    Smoking status: Not on file    Smokeless tobacco: Not on file    Alcohol use Not on file        Review of Systems:  ROS  Unable to obtain 2/2 NIPPV    OBJECTIVE:     Vital Signs (Most Recent)  Temp: 98.8 °F (37.1 °C) (09/06/17 0320)  Pulse: (!) 145 (09/06/17 0320)  Resp: (!) 25 (09/06/17 0320)  BP: (!) 156/94 (09/06/17 0320)  SpO2: (!) 91 % (09/06/17 0320)    Physical Exam  GEN: Mild distress on arrival, tremulous  HEENT: MMM, NCAT, NGT in place  Cv: irregular irregular, tachycardia  Resp: BiPAP in place, labored respirations with accessory muscle use  : Andrew in place  Abd: distended, severe diffuse TTP with rebound tenderness and guarding, decreased BS  Ext: no c/c, no TTP  Ext: irregular pulse    Laboratory    Recent Labs  Lab 09/06/17  0323   RBC 5.39   HGB 14.9   HCT 46.3      MCV 86   MCH 27.6   MCHC 32.2       Chest X-Ray: No results found in the last 24  hours.      ASSESSMENT/PLAN:     Plan:  Neuro:   -Pain control- dilaudid PRN     Pulmonary:   -extubated currently on Bipap  -wean as tolerated     Cardiac:  -MAP goal >65  - A. Fibb with RVR on presentation- given metoprolol 5mg, will start on diltiazem drip  - BNP pending  -Home meds unknown    Renal:   -New andrew placed on arrival  -strict I/O's- monitor UOP   -Bun/Cr 55/1.8    Fluids/Electrolytes/Nutrition/GI:   -Nutritional status: NPO   -replace lytes PRN  -maintenance fluids: NS @ 25 mL/hr  - NGT in place- KUB obtained to confirm placement    Hematology/Oncology:  -H/H- 14.9/46.3  --Anticoagulation: therapeutic heparin gtt    Infectious Disease:   -Afebrile  -WBC pending  - Blood cx obtained at OSH  - arrived on zosyn drip    Endocrine:  - hx of hyperglycemia at OSH, unknown diabetes hx  -Glucose goal of 120-150  -SSI    Dispo:  -Continue care in the ICU setting    Junie Narayanan   PGY-1

## 2017-09-06 NOTE — BRIEF OP NOTE
[facility]  Brief Operative Note    SUMMARY     Surgery Date: 9/6/2017     Surgeon(s) and Role:     * Simba Hansen MD - Primary     * Haja Chicas MD - Resident - Assisting        Pre-op Diagnosis:  Thrombosis [I82.90]    Post-op Diagnosis:  Post-Op Diagnosis Codes:     * Thrombosis [I82.90]    Procedure(s) (LRB):  EXPLORATORY-LAPAROTOMY (N/A)    Anesthesia: Choice    Description of Procedure: Exploratory laparotomy    Description of the findings of the procedure: Diffuse (60-70%) dead small bowel with areas of necrosis appearing as though perforation is imminent. Right colon dusky and poorly perfused as well. Non-salvageable . No interventions. Abdomen closed.     Estimated Blood Loss: * No values recorded between 9/6/2017  3:09 PM and 9/6/2017  3:48 PM *         Specimens:   Specimen (12h ago through future)    None

## 2017-09-07 PROBLEM — Z51.5 PALLIATIVE CARE ENCOUNTER: Status: ACTIVE | Noted: 2017-01-01

## 2017-09-07 NOTE — ASSESSMENT & PLAN NOTE
-Patient is DNR  -Next of kin for decision making is Mr. Zendejas  -The patient was seen this morning with TRACI Ferreira. She was sleeping and non contributory. The patient's , and daughter, Alexsandra, were present.  -Introduction to palliative medicine was made  -In discussing goals of care, the family would like the patient transferred back to [place], [place] to be closer to family. They are in agreement with her admission to hospice.  -TRACI Ferreira is working with JEANIE Morel in facilitating transfer of the patient to [place], [place]. Their assistance is appreciated.  -The patient is hospice appropriate at this time.  -Discharge options include: (a) transfer back to [facility] in [place] and use a hospice agency who has a bed contract there (b) discharge to an inpatient hospice facility closest to [place], which is [facility] in [place], [place] (c) discharge to an inpatient hospice facility in [place] if a transfer cannot be made

## 2017-09-07 NOTE — SIGNIFICANT EVENT
Notified by patient's nurse that she was pulseless and not breathing at 15:15. Arrived at patient's bedside after this. No heart tones auscultated, no breath sounds heard. Official time of death exam performed at 16:00 and time of death called at that time. Condolences to patient's family.      Carmina Altamirano MD  PGY3  [phone]

## 2017-09-07 NOTE — ANESTHESIA RELEASE NOTE
"Anesthesia Release from PACU Note    Patient: Mili Zendejas    Procedure(s) Performed: Procedure(s) (LRB):  EXPLORATORY-LAPAROTOMY (N/A)    Anesthesia type: general    Post pain: Adequate analgesia    Post assessment: no apparent anesthetic complications    Last Vitals:   Visit Vitals  BP (!) 80/54   Pulse 82   Temp 36.8 °C (98.2 °F) (Axillary)   Resp 20   Ht 5' 6" (1.676 m)   Wt 96.9 kg (213 lb 10 oz)   SpO2 (!) 87%   BMI 34.48 kg/m²       Post vital signs: stable    Level of consciousness: sedated    Nausea/Vomiting: no nausea/no vomiting    Complications: none    Airway Patency: patent    Respiratory: ETT    Cardiovascular: stable and blood pressure at baseline    Hydration: euvolemic  "

## 2017-09-07 NOTE — PLAN OF CARE
Rick faxed requested documentation to [facility] at fax [phone], as requested by JEANIE.     Manju Vega, SW [id number]

## 2017-09-07 NOTE — PLAN OF CARE
Bed number 5141  Nurse report   Nurse is to call report to above   Waiting for MD facility orders  [facility] Supervisor  Abraham Bridges.

## 2017-09-07 NOTE — ANESTHESIA POSTPROCEDURE EVALUATION
"Anesthesia Post Evaluation    Patient: Mili Zendejas    Procedure(s) Performed: Procedure(s) (LRB):  EXPLORATORY-LAPAROTOMY (N/A)    Final Anesthesia Type: general  Patient location during evaluation: ICU  Patient participation: No - Unable to Participate, Sedation  Level of consciousness: sedated  Post-procedure vital signs: reviewed and stable  Pain management: adequate  Airway patency: patent  PONV status at discharge: No PONV  Anesthetic complications: no      Cardiovascular status: blood pressure returned to baseline  Respiratory status: ETT  Hydration status: euvolemic  Follow-up not needed.        Visit Vitals  BP (!) 80/54   Pulse 82   Temp 36.8 °C (98.2 °F) (Axillary)   Resp 20   Ht 5' 6" (1.676 m)   Wt 96.9 kg (213 lb 10 oz)   SpO2 (!) 87%   BMI 34.48 kg/m²       Pain/Td Score: Pain Assessment Performed: Yes (9/7/2017  6:44 AM)  Presence of Pain: non-verbal indicators absent (9/7/2017  6:44 AM)  Pain Rating Prior to Med Admin: 10 (9/6/2017  5:00 PM)      "

## 2017-09-07 NOTE — SUBJECTIVE & OBJECTIVE
Medications:  Continuous Infusions:   morphine IV infusion 5 mg/hr (09/07/17 0400)     Scheduled Meds:   scopolamine  1 patch Transdermal Q3 Days    sodium chloride 0.9%  3 mL Intravenous Q8H     PRN Meds:HYDROmorphone, lorazepam, lorazepam     Objective:     Vital Signs (Most Recent):  Temp: 98.2 °F (36.8 °C) (09/07/17 0800)  Pulse: 82 (09/07/17 0800)  Resp: 20 (09/07/17 0800)  BP: (!) 80/54 (09/07/17 0800)  SpO2: (!) 87 % (4lnc) (09/07/17 0800) Vital Signs (24h Range):  Temp:  [98 °F (36.7 °C)-98.5 °F (36.9 °C)] 98.2 °F (36.8 °C)  Pulse:  [] 82  Resp:  [10-27] 20  SpO2:  [73 %-99 %] 87 %  BP: ()/() 80/54  Arterial Line BP: ()/() 147/147          Physical Exam   Constitutional: She appears well-developed and well-nourished.   Nursing note and vitals reviewed.  Cardiovascular: An irregularly irregular rhythm present. rate controlled    Pulmonary/Chest: No respiratory distress (coarse breath sounds bilaterally. Currently on bipap ).   Abdominal: Soft. She exhibits distension. There is tenderness. There is rebound and guarding.   Skin: Skin is warm and dry.   Neuro: Arouses to voice. Answers questions appropriately. Oriented to person but not place or time  Comfort measure in place    Significant Labs:  CBC:   Recent Labs  Lab 09/06/17  1058   WBC 12.27   RBC 4.82   HGB 13.1   HCT 41.4      MCV 86   MCH 27.2   MCHC 31.6*     CMP:   Recent Labs  Lab 09/06/17  0323 09/06/17  1058   * 236*   CALCIUM 10.0 8.9   ALBUMIN 1.8*  --    PROT 5.9*  --    * 146*   K 3.1* 2.8*   CO2 34* 33*    101   BUN 55* 58*   CREATININE 1.8* 1.7*   ALKPHOS 87  --    ALT 16  --    AST 13  --    BILITOT 1.6*  --        Significant Diagnostics:  I have reviewed and interpreted all pertinent imaging results/findings within the past 24 hours.

## 2017-09-07 NOTE — PLAN OF CARE
Rec'd call from Sue at [facility]. MIKE to send referral info to CARLOTA at fax [phone].   H/p  Progress notes  Mar  facesheet  CM notified MIKE Denia.  '  1429-- Rec'd call from Carlota at [facility] and pt is accepted to hospice but waiting for hospital to accept.   Carlota will reach out to hospital adm Neno  Pt will be complex transport, MS kristan, lorrie, cont oxygen-supervisor will place in will call

## 2017-09-07 NOTE — DISCHARGE SUMMARY
[facility]  General Surgery  Discharge Summary      Patient Name: Mili Zendejas  MRN: [id number]  Admission Date: 2017  Hospital Length of Stay: 1 days  Discharge Date and Time:  2017 4:35 PM  Attending Physician: David Keller MD   Discharging Provider: Carmina Altamiraon MD  Primary Care Provider: ELTON Alexander MD     HPI:    72 year old woman with hx five days of severe abdominal pain in the setting of a mid/distal SMA occlusion. Presented with respiratory distress and afib in addition to the abdominal pain on  to an OSH. CT with contrast was performed  and demonstrated the SMA occlusion. Systemically anticoagulated with heparin beginning on . Not candidate for revascularization. Taken to OR yesterday by general surgery who found Diffuse (60-70%) dead small bowel, no interventions were performed. Extubated overnight. Largely homebound and her baseline function per her family is limited by shortness of breath with ambulation and progressive dementia. Comfort care measures were initiated with plans for hospice, however, patient was noted to be pulseless and apneic and was pronounced  at 16:00.          Procedure(s) (LRB):  EXPLORATORY-LAPAROTOMY (N/A)     Hospital Course: 72 year old woman with hx five days of severe abdominal pain in the setting of a mid/distal SMA occlusion. Presented with respiratory distress and afib in addition to the abdominal pain on  to an OSH. CT with contrast was performed  and demonstrated the SMA occlusion. Systemically anticoagulated with heparin beginning on . Not candidate for revascularization. Taken to OR yesterday by general surgery who found Diffuse (60-70%) dead small bowel, no interventions were performed. Extubated overnight. Largely homebound and her baseline function per her family is limited by shortness of breath with ambulation and progressive dementia. Comfort care measures were initiated with plans for  hospice, however, patient was noted to be pulseless and apneic and was pronounced  at 16:00.     Consults:   Consults         Status Ordering Provider     Inpatient consult to General Surgery  Once     Provider:  (Not yet assigned)    JEANETTE Erickson     Inpatient consult to Hospice  Once     Provider:  (Not yet assigned)    Acknowledged DEDRICK TRAN     Inpatient consult to Palliative Care  Once     Provider:  (Not yet assigned)    Completed LINDSEY WHITLEY     Inpatient consult to Palliative Care  Once     Provider:  (Not yet assigned)    Completed DEDRICK TRAN          Significant Diagnostic Studies: Labs:   CMP   Recent Labs  Lab 17  0323 17  1058   * 146*   K 3.1* 2.8*    101   CO2 34* 33*   * 236*   BUN 55* 58*   CREATININE 1.8* 1.7*   CALCIUM 10.0 8.9   PROT 5.9*  --    ALBUMIN 1.8*  --    BILITOT 1.6*  --    ALKPHOS 87  --    AST 13  --    ALT 16  --    ANIONGAP 15 12   ESTGFRAFRICA 32.0* 34.2*   EGFRNONAA 27.7* 29.7*    and CBC   Recent Labs  Lab 17  0323 17  1058   WBC 15.89* 12.27   HGB 14.9 13.1   HCT 46.3 41.4    223       Pending Diagnostic Studies:     None        Final Active Diagnoses:    Diagnosis Date Noted POA    PRINCIPAL PROBLEM:  Mesenteric ischemia [K55.9]  Unknown    Palliative care encounter [Z51.5] 2017 Not Applicable    Thrombosis [I82.90] 2017 Yes      Problems Resolved During this Admission:    Diagnosis Date Noted Date Resolved POA      Discharged Condition:     Disposition:  in Medical Facil*    Follow Up:    Patient Instructions:   No discharge procedures on file.  Medications:  None (patient  at medical facility)    Jeanette Altamirano MD  General Surgery  [facility]

## 2017-09-07 NOTE — PLAN OF CARE
Problem: Patient Care Overview  Goal: Plan of Care Review  DX: SMA Thrombosis  HX: HTN, hysterectomy, tonsilectomy     9/3: presented to [place] w/ abd starting 8/30.  HTN, Afib, Hypoxia. CTA thrombosis.  9/6: Admitted to SICU, Mccarthy placed, heparin started, 500 bolus, cardizem gtt.     Nursing:  * SBP <160  * AnitXa q6  * Accu check q 6    Outcome: Ongoing (interventions implemented as appropriate)  POC reviewed with family. Comfort measures maintained. HOB at 30. Continuous morphine at 5mlhr. No distress noted. Family remains at bedside. Bed alarm set; no falls/injury/trauma. Aseptic technique utilized. No acute events. Will continue to monitor.

## 2017-09-07 NOTE — PLAN OF CARE
CM called [facility] to set up transportation and place in will-call as we are waiting on final word from [facility] in [place].   They will need accepting physician and room number when available.  Call [phone] to update info/release transport when pt is ready.

## 2017-09-07 NOTE — PLAN OF CARE
JEANIE notified by Dr Elias of possible transfer back to [place]. Cm needs rec facilities name/rec dr.  JEANIE contacted by Manju with PC; pt tx from [facility] in [place].  [facility] notified and request initiated. Jeanie spoke vitaliy Bain

## 2017-09-07 NOTE — SUBJECTIVE & OBJECTIVE
Interval History: There were no acute events overnight. At the time the patient was seen, her  and daughter were present. She was sleeping.    No past medical history on file.    No past surgical history on file.    Review of patient's allergies indicates:  Not on File    Medications:  Continuous Infusions:   morphine IV infusion 1 mg/hr (09/07/17 1007)     Scheduled Meds:   scopolamine  1 patch Transdermal Q3 Days    sodium chloride 0.9%  3 mL Intravenous Q8H     PRN Meds:HYDROmorphone, lorazepam, lorazepam    Family History     None        Social History Main Topics    Smoking status: Not on file    Smokeless tobacco: Not on file    Alcohol use Not on file    Drug use: Unknown    Sexual activity: Not on file       Review of Systems   Unable to perform ROS: Acuity of condition     Objective:     Vital Signs (Most Recent):  Temp: 98.2 °F (36.8 °C) (09/07/17 0800)  Pulse: 82 (09/07/17 0800)  Resp: 14 (09/07/17 1000)  BP: (!) 80/54 (09/07/17 0800)  SpO2: (!) 87 % (4lnc) (09/07/17 0800) Vital Signs (24h Range):  Temp:  [98 °F (36.7 °C)-98.5 °F (36.9 °C)] 98.2 °F (36.8 °C)  Pulse:  [] 82  Resp:  [10-27] 14  SpO2:  [73 %-99 %] 87 %  BP: ()/() 80/54  Arterial Line BP: ()/() 147/147     Weight: 96.9 kg (213 lb 10 oz)  Body mass index is 34.48 kg/m².    Review of Symptoms  Symptom Assessment (ESAS 0-10 scale) UNABLE TO ASSESS DUE TO PATIENT'S CURRENT ACUTE ILLNESS  ESAS 0 1 2 3 4 5 6 7 8 9 10   Pain              Dyspnea              Anxiety              Nausea              Depression               Anorexia              Fatigue              Insomnia              Restlessness               Agitation              CAM / Delirium unable to assess     Bowel Management Plan (BMP): No    OME in 24 hours: approximately 180 mg as the patient is on titrating morphine ggt    Performance Status: 10    ECOG Performance Status Grade: 4 - Completely disabled    Physical Exam   Constitutional:  She appears well-developed and well-nourished.   HENT:   Head: Normocephalic and atraumatic.   Cardiovascular: An irregular rhythm present. Tachycardia present.    Pulmonary/Chest: Effort normal. No respiratory distress.   Abdominal: Soft. Bowel sounds are decreased.   Neurological:   Patient was sleeping. Unable to assess orientation   Skin: Skin is warm and dry. There is pallor.   Psychiatric:   Unable to assess   Nursing note and vitals reviewed.      Significant Labs: All pertinent labs within the past 24 hours have been reviewed.  CBC:     Recent Labs  Lab 17  1058   WBC 12.27   HGB 13.1   HCT 41.4   MCV 86        BMP:    Recent Labs  Lab 17  1058   *   *   K 2.8*      CO2 33*   BUN 58*   CREATININE 1.7*   CALCIUM 8.9   MG 2.1     LFT:  Lab Results   Component Value Date    AST 13 2017    ALKPHOS 87 2017    BILITOT 1.6 (H) 2017     Albumin:   Albumin   Date Value Ref Range Status   2017 1.8 (L) 3.5 - 5.2 g/dL Final     Protein:   Total Protein   Date Value Ref Range Status   2017 5.9 (L) 6.0 - 8.4 g/dL Final     Lactic acid:   Lab Results   Component Value Date    LACTATE 1.1 2017    LACTATE 1.4 2017       Significant Imaging: I have reviewed all pertinent imaging results/findings within the past 24 hours.    Advanced Directives::  Living Will: No  LaPOST: No  Do Not Resuscitate Status: Yes  Medical Power of : No    Decision-Making Capacity: Family answered questions    Living Arrangements: Lives with family    Psychosocial/Cultural:  Mrs. Zendejas lives in [place], [place] with her  of 54 years, Ghulam. They had a daughter (Tasneem) and a son who is . There are 2 biological grandchildren and 3 others they consider grandchildren. 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 Hobbies include reading, watching TV, and gardening.    Spiritual:     F- Chani and Belief: Raised Adventist    I - Importance: does  not attend regular service  .  C - Community: no mention of community involvement    A - Address in Care: received blessing from  at [facility]

## 2017-09-07 NOTE — HPI
Mili Zendejas is a 72 y.o. female with HTN and dementia who was [place] [facility] in [place], [place] due to SMA occlusion. Per vascular surgery she is not a candidate for revascularization. General surgery performed an exploratory-laparotomy on 9/6 which revealed diffuse dead small bowel with necrosis, which is non salvageable; no interventions are available. Palliative medicine was consulted for goals of care/end of life.

## 2017-09-07 NOTE — PLAN OF CARE
Sw following for d/c needs.     UPDATE 10:35 AM   Sw spoke to Palliative Care Rick Nunes. Manju informed Sw of palliative care plan: Plan A: Transfer to [facility] with [facility] or [facility], Plan B: [facility] in [place] [phone], Plan C: Local [facility] Hospice. Pal Care Sw stated CM is working with the transfer center on potential transfer back to [facility]. Sw will reach out to [facility]/ [facility] once [facility] has stated they are open to the transfer.      Manju Vega, SW [phone]

## 2017-09-07 NOTE — PLAN OF CARE
CM requested MD to write facility tx orders with [facility].  Still waiting on acceptance from hospital.

## 2017-09-07 NOTE — ASSESSMENT & PLAN NOTE
72 year old woman with hx five days of severe abdominal pain in the setting of a mid/distal SMA occlusion. Presented with respiratory distress and afib in addition to the abdominal pain on 9/1 to an OSH. CT with contrast was performed 9/5 and demonstrated the SMA occlusion. Systemically anticoagulated with heparin beginning on 9/5. Not candidate for revascularization. Taken to OR yesterday by general surgery who found Diffuse (60-70%) dead small bowel, no interventions were performed. Extubated overnight. Largely homebound and her baseline function per her family is limited by shortness of breath with ambulation and progressive dementia. Plan for comfort care at this time.     -morphine drip  -Ativan PRN  -scopolamine  -consult palliative  -SW/CW, family hopeful for transfer to [place]  -

## 2017-09-07 NOTE — OP NOTE
DATE OF PROCEDURE:  09/06/2017    PREOPERATIVE DIAGNOSIS:  Mesenteric ischemia.    POSTOPERATIVE DIAGNOSIS:  Mesenteric ischemia.    PROCEDURES:  Exploratory laparotomy.    SURGEON:  Simba Hansen M.D.    ASSISTANT:  Haja Chicas M.D. (RES)     ANESTHESIA:  General.    CLINICAL NOTE:  This patient is a 72-year-old female, transferred from an   outside hospital with mesenteric ischemia and evidence of SMA occlusion.    Vascular Surgery has no interventional options for this patient given the   location and extent of the occlusion.  She has developed worsening peritoneal   signs in the ICU and is brought to the operating room for exploration.    PROCEDURE NOTE:  Following induction of adequate general anesthesia, the   patient's abdomen was prepped and draped with ChloraPrep.  We made a generous   midline incision and dissected down to the linea alba between, incised and then   entered the peritoneal cavity.  We are medially greeted with a significant   amount of frankly necrotic and gangrenous small intestine as well as right   colon.  We explored and it was clear that given the patient's underlying   condition and the extent of necrosis that resection would be futile.  We   therefore elected to close the linea alba with #1 PDS and the skin with staples.    She will be placed in palliative care status and kept comparable in   anticipation of her unfortunate demise.  Estimated blood loss was 10 mL.      MCT/HN  dd: 09/07/2017 11:08:50 (CDT)  td: 09/07/2017 11:30:28 (CDT)  Doc ID   #[id number]  Job ID #[id number]    CC:

## 2017-09-07 NOTE — PLAN OF CARE
JEANIE rec'd call from Melinda w [facility]. Hospital bed/Palliative Med bed  Per MAR, pt is on a cont MS gtt  Mccarthy  Cont oxygen 4liters  Per  Lawrence in RT center, there may be transport costs associated with this transfer. Jeanie paged Manju with PC to inform of potential out of pocket costs for family.  Spoke with Denia Vega, MSW, about costs but per MSW, it should be covered by insurance.

## 2017-09-07 NOTE — NURSING TRANSFER
Nursing Transfer Note      9/7/2017     Transfer To: [id number] From: [id number]    Transfer via bed    Transfer with n/a    Transported by RENNY Akins    Medicines sent: Morphine gtt    Chart send with patient: Yes    Notified: spouse, daughter    Patient reassessed at: 9/7/2017, 0300    Upon arrival to floor: patient oriented to room, call bell in reach and bed in lowest position    RN @ pt bedside.  Family @ bedside. All questions answered.  Comfort care maintained.

## 2017-09-07 NOTE — PROGRESS NOTES
[facility]  Vascular Surgery  Progress Note    Patient Name: Mili Zendejas  MRN: [id number]  Admission Date: 9/6/2017  Primary Care Provider: ELTON Alexander MD    Subjective:     Interval History: Extubated overnight. Comfort care initiated    Post-Op Info:  Procedure(s) (LRB):  EXPLORATORY-LAPAROTOMY (N/A)   1 Day Post-Op       Medications:  Continuous Infusions:   morphine IV infusion 5 mg/hr (09/07/17 0400)     Scheduled Meds:   scopolamine  1 patch Transdermal Q3 Days    sodium chloride 0.9%  3 mL Intravenous Q8H     PRN Meds:HYDROmorphone, lorazepam, lorazepam     Objective:     Vital Signs (Most Recent):  Temp: 98.2 °F (36.8 °C) (09/07/17 0800)  Pulse: 82 (09/07/17 0800)  Resp: 20 (09/07/17 0800)  BP: (!) 80/54 (09/07/17 0800)  SpO2: (!) 87 % (4lnc) (09/07/17 0800) Vital Signs (24h Range):  Temp:  [98 °F (36.7 °C)-98.5 °F (36.9 °C)] 98.2 °F (36.8 °C)  Pulse:  [] 82  Resp:  [10-27] 20  SpO2:  [73 %-99 %] 87 %  BP: ()/() 80/54  Arterial Line BP: ()/() 147/147          Physical Exam   Constitutional: She appears well-developed and well-nourished.   Nursing note and vitals reviewed.  Cardiovascular: An irregularly irregular rhythm present. rate controlled    Pulmonary/Chest: No respiratory distress (coarse breath sounds bilaterally. Currently on bipap ).   Abdominal: Soft. She exhibits distension. There is tenderness. There is rebound and guarding.   Skin: Skin is warm and dry.   Neuro: Arouses to voice. Answers questions appropriately. Oriented to person but not place or time  Comfort measure in place    Significant Labs:  CBC:   Recent Labs  Lab 09/06/17  1058   WBC 12.27   RBC 4.82   HGB 13.1   HCT 41.4      MCV 86   MCH 27.2   MCHC 31.6*     CMP:   Recent Labs  Lab 09/06/17  0323 09/06/17  1058   * 236*   CALCIUM 10.0 8.9   ALBUMIN 1.8*  --    PROT 5.9*  --    * 146*   K 3.1* 2.8*   CO2 34* 33*    101   BUN 55* 58*   CREATININE  1.8* 1.7*   ALKPHOS 87  --    ALT 16  --    AST 13  --    BILITOT 1.6*  --        Significant Diagnostics:  I have reviewed and interpreted all pertinent imaging results/findings within the past 24 hours.    Assessment/Plan:     Mesenteric ischemia    72 year old woman with hx five days of severe abdominal pain in the setting of a mid/distal SMA occlusion. Presented with respiratory distress and afib in addition to the abdominal pain on 9/1 to an OSH. CT with contrast was performed 9/5 and demonstrated the SMA occlusion. Systemically anticoagulated with heparin beginning on 9/5. Not candidate for revascularization. Taken to OR yesterday by general surgery who found Diffuse (60-70%) dead small bowel, no interventions were performed. Extubated overnight. Largely homebound and her baseline function per her family is limited by shortness of breath with ambulation and progressive dementia. Plan for comfort care at this time.     -morphine drip  -Ativan PRN  -scopolamine  -consult palliative  -SW/CW, family hopeful for transfer to [facility]ed Alfaro MD  Vascular Surgery  [facility]

## 2017-09-07 NOTE — PLAN OF CARE
Still waiting on hospital acceptance but we do have hospice acceptance. Pending facility transfer orders from MD as documented in previous note.  Supervisor placed transport in will call.   Orders will need to be faxed if pt accepted to hospital.   Cm checked out with [facility] to call on call  pt receives hospital acceptance.

## 2017-09-07 NOTE — CONSULTS
[facility]  Palliative Medicine  Consult Note    Patient Name: Mili Zendejas  MRN: [id number]  Admission Date: 9/6/2017  Hospital Length of Stay: 1 days  Code Status: DNR   Attending Provider: David Keller MD  Consulting Provider: Ashleigh Oneal PA-C  Primary Care Physician: ELTON Alexander MD  Principal Problem:<principal problem not specified>    Patient information was obtained from spouse/SO and relative(s).      Inpatient consult to Palliative Care  Consult performed by: ASHLEIGH ONEAL  Consult ordered by: LINDSEY WHITLEY  Reason for consult: goals of care/ end of life        Assessment/Plan:   Mili Zendejas is a 72 y.o. female with HTN and dementia who [place] [facility] [place] [facility] in [place], [place] due to SMA occlusion. Per vascular surgery she is not a candidate for revascularization. General surgery performed an exploratory-laparotomy on 9/6 which revealed diffuse dead small bowel with necrosis, which is non salvageable; no interventions are available. Palliative medicine was consulted for goals of care/end of life.     Palliative care encounter    -Patient is DNR  -Next of kin for decision making is Mr. Zendejas  -The patient was seen this morning with TRACI Ferreira. She was sleeping and non contributory. The patient's , and daughter, Alexsandra, were present.  -Introduction to palliative medicine was made  -In discussing goals of care, the family would like the patient transferred back to [place], [place] to be closer to family. They are in agreement with her admission to hospice.  -TRACI Ferreira is working with JEANIE Morel in facilitating transfer of the patient to [place], [place]. Their assistance is appreciated.  -The patient is hospice appropriate at this time.  -Discharge options include: (a) transfer back to [facility] in [place] and use a hospice agency who has a bed contract there (b) discharge to an inpatient hospice  facility closest to [place], which is [facility] in [place], [place] (c) discharge to an inpatient hospice facility in [place] if a transfer cannot be made              Thank you for your consult. I will follow-up with patient. Please contact us if you have any additional questions.    Subjective:     HPI:   Mili Zendejas is a 72 y.o. female with HTN and dementia who was transferred to [facility] from [facility] in [place], [place] due to SMA occlusion. Per vascular surgery she is not a candidate for revascularization. General surgery performed an exploratory-laparotomy on 9/6 which revealed diffuse dead small bowel with necrosis, which is non salvageable; no interventions are available. Palliative medicine was consulted for goals of care/end of life.     Interval History: There were no acute events overnight. At the time the patient was seen, her  and daughter were present. She was sleeping.      Medications:  Continuous Infusions:   morphine IV infusion 1 mg/hr (09/07/17 1007)     Scheduled Meds:   scopolamine  1 patch Transdermal Q3 Days    sodium chloride 0.9%  3 mL Intravenous Q8H     PRN Meds:HYDROmorphone, lorazepam, lorazepam    Family History     None        Social History Main Topics    Smoking status: Not on file    Smokeless tobacco: Not on file    Alcohol use Not on file    Drug use: Unknown    Sexual activity: Not on file       Review of Systems   Unable to perform ROS: Acuity of condition     Objective:     Vital Signs (Most Recent):  Temp: 98.2 °F (36.8 °C) (09/07/17 0800)  Pulse: 82 (09/07/17 0800)  Resp: 14 (09/07/17 1000)  BP: (!) 80/54 (09/07/17 0800)  SpO2: (!) 87 % (4lnc) (09/07/17 0800) Vital Signs (24h Range):  Temp:  [98 °F (36.7 °C)-98.5 °F (36.9 °C)] 98.2 °F (36.8 °C)  Pulse:  [] 82  Resp:  [10-27] 14  SpO2:  [73 %-99 %] 87 %  BP: ()/() 80/54  Arterial Line BP: ()/() 147/147     Weight: 96.9 kg (213 lb 10 oz)  Body  mass index is 34.48 kg/m².    Review of Symptoms  Symptom Assessment (ESAS 0-10 scale) UNABLE TO ASSESS DUE TO PATIENT'S CURRENT ACUTE ILLNESS  ESAS 0 1 2 3 4 5 6 7 8 9 10   Pain              Dyspnea              Anxiety              Nausea              Depression               Anorexia              Fatigue              Insomnia              Restlessness               Agitation              CAM / Delirium unable to assess     Bowel Management Plan (BMP): No    OME in 24 hours: approximately 180 mg as the patient is on titrating morphine ggt    Performance Status: 10    ECOG Performance Status Grade: 4 - Completely disabled    Physical Exam   Constitutional: She appears well-developed and well-nourished.   HENT:   Head: Normocephalic and atraumatic.   Cardiovascular: An irregular rhythm present. Tachycardia present.    Pulmonary/Chest: Effort normal. No respiratory distress.   Abdominal: Soft. Bowel sounds are decreased.   Neurological:   Patient was sleeping. Unable to assess orientation   Skin: Skin is warm and dry. There is pallor.   Psychiatric:   Unable to assess   Nursing note and vitals reviewed.      Significant Labs: All pertinent labs within the past 24 hours have been reviewed.  CBC:     Recent Labs  Lab 09/06/17  1058   WBC 12.27   HGB 13.1   HCT 41.4   MCV 86        BMP:    Recent Labs  Lab 09/06/17  1058   *   *   K 2.8*      CO2 33*   BUN 58*   CREATININE 1.7*   CALCIUM 8.9   MG 2.1     LFT:  Lab Results   Component Value Date    AST 13 09/06/2017    ALKPHOS 87 09/06/2017    BILITOT 1.6 (H) 09/06/2017     Albumin:   Albumin   Date Value Ref Range Status   09/06/2017 1.8 (L) 3.5 - 5.2 g/dL Final     Protein:   Total Protein   Date Value Ref Range Status   09/06/2017 5.9 (L) 6.0 - 8.4 g/dL Final     Lactic acid:   Lab Results   Component Value Date    LACTATE 1.1 09/06/2017    LACTATE 1.4 09/06/2017       Significant Imaging: I have reviewed all pertinent imaging  results/findings within the past 24 hours.    Advanced Directives::  Living Will: No  LaPOST: No  Do Not Resuscitate Status: Yes  Medical Power of : No    Decision-Making Capacity: Family answered questions    Living Arrangements: Lives with family    Psychosocial/Cultural:  Mrs. Zendejas lives in [place], [place] with her  of 54 years, Ghulam. They had a daughter (Tasneem) and a son who is . There are 2 biological grandchildren and 3 others they consider grandchildren. 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 Hobbies include reading, watching TV, and gardening.    Spiritual:     F- Chani and Belief: Raised Samaritan    I - Importance: does not attend regular service  .  C - Community: no mention of community involvement    A - Address in Care: received blessing from  at prior hospital      > 50% of 75 min visit spent in chart review, face to face discussion of goals of care,  symptom assessment, coordination of care and emotional support.    MOUNA ShenC  Palliative Medicine  [facility]
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
## 2017-09-08 PROBLEM — K55.9 MESENTERIC ISCHEMIA: Status: ACTIVE | Noted: 2017-09-08

## 2017-09-10 LAB
BLD PROD TYP BPU: NORMAL
BLOOD UNIT EXPIRATION DATE: NORMAL
BLOOD UNIT TYPE CODE: 8400
BLOOD UNIT TYPE: NORMAL
CODING SYSTEM: NORMAL
DISPENSE STATUS: NORMAL
TRANS ERYTHROCYTES VOL PATIENT: NORMAL ML

## 2017-09-12 NOTE — PHYSICIAN QUERY
PT Name: Mili Zendejas  MR #: [id number]    Physician Query Form - Respiratory Condition Clarification      CDS/: Kiki Abarca RN, CDS               Contact information: [email or web address]    This form is a permanent document in the medical record.    Query Date: 2017    By submitting this query, we are merely seeking further clarification of documentation. Please utilize your independent clinical judgment when addressing the question(s) below.    The Medical record contains the following   Indicators   Supporting Clinical Findings Location in Medical Record   x   SOB, NOGUERA, Wheezing, Productive Cough, Use of Accessory Muscles, etc. --Resp: BiPAP in place, labored respirations with accessory muscle use    [facility] H&P    x   Acute/Chronic Illness -Afib with RVR  - embolus/thrombosis of the mid  superior SMA.    [facility] H&P       Radiology Findings     x   Respiratory Distress or Failure --At OSH she was noted to be in A fibb with RVR and associated respiratory distress and was placed on cardizem drip and IV lasix for pulmonary edema in the ICU    --While there she was treated  for hypoxic respiratory failure and presumed heart failure.   [facility] H&P             Vascular Sx H&P       Hypoxia or Hypercapnia     x   RR         ABGs         O2 sat RR: 31->16->27    Sats: 94% on 60% Fio2 via Bipap--> 93% on 12L FiO2 via HF nasal cannula    AB.522/47.8/78/96% on 60% FiO2 via Bipap   VS Flowsheet           ABG  @ 0455   x   BiPAP/Intubation 60% FiO2 via Bipap--> 12L FiO2 HF NC VS Flowsheet     Supplemental O2      Home O2, Oxygen Dependence      Treatment      Other       Provider, please specify diagnosis or diagnoses associated with above clinical findings.    [  ] Acute Respiratory Failure with Hypoxia  [  ] Acute Respiratory Failure with Hypercapnia  [  ] Acute Respiratory Failure with Hypoxia and Hypercapnia  [  ] Other Acute Respiratory Failure  [  ] Acute Respiratory  Distress  [  ] Other Respiratory Diagnosis (please specify): _______________________________  [ X Clinically Undetermined    Please document in your progress notes daily for the duration of treatment until resolved and include in your discharge summary.

## 2017-09-12 NOTE — PHYSICIAN QUERY
"PT Name: Mili Zendejas  MR #: [id number]    Physician Query Form - Heart  Condition Clarification     CDS/: Kiki Abarca RN, CDS               Contact information: [email or web address]  This form is a permanent document in the medical record.     Query Date: September 12, 2017    By submitting this query, we are merely seeking further clarification of documentation. Please utilize your independent clinical judgment when addressing the question(s) below.    The medical record contains the following   Indicators     Supporting Clinical Findings Location in Medical Record   x BNP BNP: 245 Lab: 9/6   x EF EF: 45% Echo: 9/6   x Radiology findings --There is cardia megaly, moderate edema, pleural fluid   CXR 9/6   x Echo Results --2 - Mildly depressed left ventricular systolic function (EF 45-50%).     3 - Right ventricle is upper limit of normal in size with low normal to mildly depressed systolic function.     4 - Indeterminate LV diastolic function.     5 - Biatrial enlargement.    Echo 9/6    "Ascites" documented     x "SOB" or "NOGUERA" documented --She is largely homebound and her baseline function per her family is limited by shortness of breath with ambulation   Vascular Sx H&P 9/6   x "Hypoxia" documented --While there she was treated  for hypoxic respiratory failure and presumed heart failure   V[facility] Sx H&P 9/6   x Heart Failure documented --While there she was treated  for hypoxic respiratory failure and presumed heart failure   Vascular Sx H&P 9/6    "Edema" documented     x Diuretics/Meds Metoprolol 5mg IN x 1  (9/6)  Cardizem gtt   MAR    Treatment:     x Other:  --At OSH she was noted to be in A fibb with RVR and associated respiratory distress and was placed on cardizem drip and IV lasix for pulmonary edema in the ICU.  --- A. Fibb with RVR on presentation- given metoprolol 5mg, will start on diltiazem drip   --BiPAP in place, labored respirations with accessory muscle use    [facility] H&P " 9/6                 Provider, please specify diagnosis or diagnoses associated with above clinical findings.                               [  ] Acute Systolic Heart Failure ( EF < 40)*  [  ] Acute on Chronic Systolic Heart Failure ( EF < 40)*  [  ] Chronic Systolic Heart Failure (EF < 40)*  [  ] Acute Diastolic Heart Failure ( EF > 40)*  [  ] Acute on Chronic Diastolic Heart Failure( EF > 40)*  [  ] Chronic Diastolic Heart Failure (EF > 40)*  [  ] Acute Combined Systolic and Diastolic Heart Failure  [  ] Acute on Chronic Combined Systolic and Diastolic Heart Failure  [  ] Chronic Combined Systolic and Diastolic Heart Failure  [  ] Other Type of Heart Failure (please specify type): _________________________  [  ] Heart Failure Ruled Out  [  ] Other (please specify): ___________________________________  [X] Clinically Undetermined            *American Heart Association                                                                                                          Please document in your progress notes daily for the duration of treatment until resolved and include in your discharge summary.

## 2017-09-12 NOTE — PHYSICIAN QUERY
PT Name: Mili Zendejas  MR #: [id number]    Physician Query Form - Atrial Fibrillation Specificity     CDS/: Kiki Abarca RN, CDS               Contact information: [email or web address]     This form is a permanent document in the medical record.     Query Date: September 12, 2017    By submitting this query, we are merely seeking further clarification of documentation. Please utilize your independent clinical judgment when addressing the question(s) below.    The medical record contains the following:   Indicators     Supporting Clinical Findings Location in Medical Record   x Atrial Fibrillation --She initially presented with respiratory distress and afib in addition to the abdominal pain on 9/1 to an OSH  --Her family was unaware that she has atrial fibrillation.  She was not on any anticoagulation    --- A. Fibb with RVR on presentation- given metoprolol 5mg, will start on diltiazem drip    Vascular Sx H&P 9/6              CCM H&P 9/6    EKG results     x Medication Cardizem gtt  Metoprolol 5mg IV x 1  MAR    Treatment      Other         Provider, please further specify the Atrial Fibrillation diagnosis.    [X ] Chronic  [  ] Paroxysmal  [  ] Permanent  [  ] Persistent  [  ] Other (please specify): ____________________________  [  ] Clinically Undetermined    Please document in your progress notes daily for the duration of treatment until resolved, and include in your discharge summary.

## (undated) DEVICE — SEE MEDLINE ITEM 146417

## (undated) DEVICE — DRAPE INCISE IOBAN 2 23X17IN

## (undated) DEVICE — BLADE 4 INCH EDGE UN-INS

## (undated) DEVICE — DRAPE ABDOMINAL TIBURON 14X11

## (undated) DEVICE — STAPLER SKIN PROXIMATE WIDE

## (undated) DEVICE — ELECTRODE REM PLYHSV RETURN 9

## (undated) DEVICE — DRESSING ADH ISLAND 3.6 X 14

## (undated) DEVICE — TOWEL OR XRAY WHITE 17X26IN

## (undated) DEVICE — SEE MEDLINE ITEM 156902

## (undated) DEVICE — SUT 1 48IN PDS II VIO MONO